# Patient Record
Sex: MALE | Employment: OTHER | URBAN - METROPOLITAN AREA
[De-identification: names, ages, dates, MRNs, and addresses within clinical notes are randomized per-mention and may not be internally consistent; named-entity substitution may affect disease eponyms.]

---

## 2018-10-09 DIAGNOSIS — I65.23 CAROTID STENOSIS, BILATERAL: Primary | ICD-10-CM

## 2018-10-12 ENCOUNTER — HOSPITAL ENCOUNTER (OUTPATIENT)
Dept: RADIOLOGY | Facility: HOSPITAL | Age: 73
Discharge: HOME | End: 2018-10-12
Attending: FAMILY MEDICINE
Payer: MEDICARE

## 2018-10-12 DIAGNOSIS — I65.23 CAROTID STENOSIS, BILATERAL: ICD-10-CM

## 2018-10-12 PROCEDURE — 93880 EXTRACRANIAL BILAT STUDY: CPT | Mod: TC

## 2018-10-29 ENCOUNTER — OFFICE VISIT (OUTPATIENT)
Dept: OTOLARYNGOLOGY | Facility: CLINIC | Age: 73
End: 2018-10-29
Payer: MEDICARE

## 2018-10-29 ENCOUNTER — CLINICAL SUPPORT (OUTPATIENT)
Dept: AUDIOLOGY | Facility: CLINIC | Age: 73
End: 2018-10-29
Payer: MEDICARE

## 2018-10-29 VITALS — HEIGHT: 69 IN | BODY MASS INDEX: 26.31 KG/M2 | WEIGHT: 177.63 LBS | TEMPERATURE: 98 F | HEART RATE: 62 BPM

## 2018-10-29 DIAGNOSIS — H81.09 ENDOLYMPHATIC HYDROPS, UNSPECIFIED LATERALITY: ICD-10-CM

## 2018-10-29 DIAGNOSIS — H90.3 SENSORINEURAL HEARING LOSS (SNHL) OF BOTH EARS: ICD-10-CM

## 2018-10-29 DIAGNOSIS — H93.11 TINNITUS, RIGHT EAR: ICD-10-CM

## 2018-10-29 PROCEDURE — 92557 COMPREHENSIVE HEARING TEST: CPT | Mod: PBBFAC | Performed by: AUDIOLOGIST-HEARING AID FITTER

## 2018-10-29 PROCEDURE — 1101F PT FALLS ASSESS-DOCD LE1/YR: CPT | Mod: CPTII,,, | Performed by: ORTHOPAEDIC SURGERY

## 2018-10-29 PROCEDURE — 99204 OFFICE O/P NEW MOD 45 MIN: CPT | Mod: S$PBB,,, | Performed by: ORTHOPAEDIC SURGERY

## 2018-10-29 PROCEDURE — 99999 PR PBB SHADOW E&M-EST. PATIENT-LVL III: CPT | Mod: PBBFAC,,, | Performed by: ORTHOPAEDIC SURGERY

## 2018-10-29 PROCEDURE — 99213 OFFICE O/P EST LOW 20 MIN: CPT | Mod: PBBFAC,25 | Performed by: ORTHOPAEDIC SURGERY

## 2018-10-29 PROCEDURE — 92567 TYMPANOMETRY: CPT | Mod: PBBFAC | Performed by: AUDIOLOGIST-HEARING AID FITTER

## 2018-10-29 RX ORDER — TRIAMCINOLONE ACETONIDE 1 MG/G
CREAM TOPICAL
Refills: 2 | COMMUNITY
Start: 2018-08-25

## 2018-10-29 RX ORDER — CARVEDILOL 3.12 MG/1
3.12 TABLET ORAL DAILY
Refills: 2 | COMMUNITY
Start: 2018-09-09

## 2018-10-29 RX ORDER — OMEPRAZOLE 20 MG/1
20 CAPSULE, DELAYED RELEASE ORAL
COMMUNITY
Start: 2014-09-26

## 2018-10-29 RX ORDER — ASPIRIN 81 MG/1
81 TABLET ORAL DAILY
Refills: 2 | COMMUNITY
Start: 2018-09-09

## 2018-10-29 RX ORDER — AMLODIPINE BESYLATE 10 MG/1
10 TABLET ORAL DAILY
Refills: 2 | COMMUNITY
Start: 2018-09-09

## 2018-10-29 RX ORDER — FLUOCINONIDE TOPICAL SOLUTION USP, 0.05% 0.5 MG/ML
SOLUTION TOPICAL
Refills: 2 | COMMUNITY
Start: 2018-08-25

## 2018-10-29 RX ORDER — SILDENAFIL 100 MG/1
100 TABLET, FILM COATED ORAL DAILY PRN
COMMUNITY
Start: 2014-09-26

## 2018-10-29 RX ORDER — SIMVASTATIN 20 MG/1
TABLET, FILM COATED ORAL
COMMUNITY
Start: 2014-09-26

## 2018-10-29 RX ORDER — TRIAMTERENE AND HYDROCHLOROTHIAZIDE 37.5; 25 MG/1; MG/1
CAPSULE ORAL
COMMUNITY
Start: 2015-05-19

## 2018-10-29 RX ORDER — TRIAMTERENE/HYDROCHLOROTHIAZID 37.5-25 MG
1 TABLET ORAL EVERY MORNING
Refills: 1 | COMMUNITY
Start: 2018-10-09 | End: 2018-10-29

## 2018-10-29 RX ORDER — ASPIRIN 81 MG/1
81 TABLET ORAL
COMMUNITY
End: 2018-10-29

## 2018-10-29 RX ORDER — OMEPRAZOLE 20 MG/1
CAPSULE, DELAYED RELEASE ORAL
Refills: 2 | COMMUNITY
Start: 2018-09-09

## 2018-10-29 RX ORDER — ATORVASTATIN CALCIUM 40 MG/1
TABLET, FILM COATED ORAL
Refills: 2 | COMMUNITY
Start: 2018-09-14

## 2018-10-29 RX ORDER — KETOCONAZOLE 20 MG/ML
SHAMPOO, SUSPENSION TOPICAL
Refills: 3 | COMMUNITY
Start: 2018-08-25

## 2018-10-29 NOTE — PROGRESS NOTES
Subjective:       Patient ID: Norman Rodriguez is a 73 y.o. male.    Chief Complaint: Hearing Loss    Patient is a very pleasant 73 y.o. male here to see me today for the first time for evaluation of hearing loss.  He reports hearing loss that has been gradually progressing over the last 10 years.  He has noted a difference in hearing between the ears, with the right ear being the better hearing ear.  He has noted any tinnitus in both ears.  He has not had any recent issues with ear pain or ear drainage.  He denies a family history of hearing loss, and has not had any previous otologic surgery.  He has a history of significant loud noise exposure, surrounded by machines at work.  He denies issues with dizziness.  He has seen Dr. Maher at [facility] with similar complaints, and he had an asymmetric SNHL with the right ear being worse than the left at that time, last seen in 2013.  From his documentation, he did have an MRI of the IAC at that time which was normal.  However, that evaluation did prompt a left CEA.        Review of Systems   Constitutional: Negative for fatigue, fever and unexpected weight change.   HENT: Positive for hearing loss and tinnitus. Negative for congestion, ear discharge, ear pain, facial swelling, nosebleeds, postnasal drip, rhinorrhea, sinus pressure, sneezing, sore throat, trouble swallowing and voice change.    Eyes: Negative for discharge, redness and itching.   Respiratory: Negative for cough, choking, shortness of breath and wheezing.    Cardiovascular: Negative for chest pain and palpitations.   Gastrointestinal: Negative for abdominal pain.        No reflux.   Musculoskeletal: Negative for neck pain.   Neurological: Negative for dizziness, facial asymmetry, light-headedness and headaches.   Hematological: Negative for adenopathy. Does not bruise/bleed easily.   Psychiatric/Behavioral: Negative for agitation, behavioral problems, confusion and decreased concentration.       Objective:       Physical Exam   Constitutional: He is oriented to person, place, and time. Vital signs are normal. He appears well-developed and well-nourished. No distress.   HENT:   Head: Normocephalic and atraumatic.   Right Ear: Hearing, tympanic membrane, external ear and ear canal normal.   Left Ear: Hearing, tympanic membrane, external ear and ear canal normal.   Nose: Nose normal. No mucosal edema, rhinorrhea, nasal deformity or septal deviation.   Mouth/Throat: Uvula is midline, oropharynx is clear and moist and mucous membranes are normal. No trismus in the jaw. Normal dentition. No uvula swelling. No oropharyngeal exudate or posterior oropharyngeal edema.   Eyes: Conjunctivae and EOM are normal. Pupils are equal, round, and reactive to light. Right eye exhibits no chemosis. Left eye exhibits no chemosis. Right conjunctiva is not injected. Left conjunctiva is not injected. No scleral icterus.   Neck: Trachea normal and phonation normal. No tracheal tenderness present. No tracheal deviation present. No thyroid mass and no thyromegaly present.   Cardiovascular: Intact distal pulses.   Pulmonary/Chest: Effort normal. No accessory muscle usage or stridor. No respiratory distress.   Lymphadenopathy:        Head (right side): No submental, no submandibular, no preauricular and no posterior auricular adenopathy present.        Head (left side): No submental, no submandibular, no preauricular and no posterior auricular adenopathy present.     He has no cervical adenopathy.        Right cervical: No superficial cervical and no deep cervical adenopathy present.       Left cervical: No superficial cervical and no deep cervical adenopathy present.   Neurological: He is alert and oriented to person, place, and time. No cranial nerve deficit.   Skin: Skin is warm and dry. No rash noted. No erythema.   Psychiatric: He has a normal mood and affect. His behavior is normal. Thought content normal.       AUDIOLOGY:  Asymmetric right SNHL,  right worse than left for nearly all frequencies        Assessment:       1. Asymmetrical right sensorineural hearing loss    2. Sensorineural hearing loss (SNHL) of both ears        Plan:       1.  Asymmetric right sensorineural hearing loss:  Will have him sign a release form breast obtain a copy of his MRI report from Dr. Maher.  Through his epic chart, I am able to see his note which does reference a normal MRI, but I cannot actually see the MRI report.  Discussed that if his MRI is negative, he is cleared to proceed with hearing aids, and often people have an asymmetric hearing loss with 1 ear being worse than the left.  2.  Sensorineural hearing loss bilaterally:  He says that he has tried hearing aids own, and did find some benefit from having a in the right. I would recommend he try bilaterally vocation, but due to his severe distortion he may benefit from only 1 aid in the left ear.  Will schedule follow-up with audiology in the near future for hearing aid evaluation.

## 2018-10-29 NOTE — LETTER
October 29, 2018      Jameel Smart MD  [id number] [facility] [address]           [facility]  [address] [place] [id number]  Phone: [phone]  Fax: [phone]          Patient: Norman Rodriguez   MR Number: [id number]   YOB: 1945   Date of Visit: 10/29/2018       Dear Dr. Jameel Smart:    Thank you for referring Norman Rodriguez to me for evaluation. Attached you will find relevant portions of my assessment and plan of care.    If you have questions, please do not hesitate to call me. I look forward to following Norman Rodriguez along with you.    Sincerely,    Shahla Grant MD    Enclosure  CC:  No Recipients    If you would like to receive this communication electronically, please contact [email or web address] or [phone] to request more information on [employer] access.    For providers and/or their staff who would like to refer a patient to [facility], please contact us through our one-stop-shop provider referral line, [facility], at [phone].    If you feel you have received this communication in error or would no longer like to receive these types of communications, please e-mail [email or web address]

## 2018-11-06 ENCOUNTER — CLINICAL SUPPORT (OUTPATIENT)
Dept: AUDIOLOGY | Facility: CLINIC | Age: 73
End: 2018-11-06
Payer: MEDICARE

## 2018-11-06 DIAGNOSIS — Z71.89 HEARING AID CONSULTATION: Primary | ICD-10-CM

## 2018-11-06 NOTE — PROGRESS NOTES
Norman Rodriguez was seen 11/06/2018 for a hearing aid consult to discuss hearing aids.   He does not currently wear hearing aids.  He tried in-office demo of binaural amplification elsewhere and found significant benefit in the office.   He had been to several hearing aid retail chains to talk about hearing aids.  He has 30+ year history of hearing loss, right asymmetrical; has followed with Dr. Ramires.  Most recent audiogram revealed he had no word recognition for the right ear (describes distortion AD).  For that reason, I have concerns about traditional amplification to both ears outside of the office setting.  I discussed BiCROS.  This is the first time he hears about BiCROS.  I discussed option of trying binaural amplification vs BiCROS, and explained trial period.  His primary concern is understanding his wife.  He was counseled about styles (custom vs CSATILLO), different technology levels including channels and bands, and I briefly spoke about hearing aid features such as phone direct, streaming, rechargeable.  He expects [facility] will provide hearing aid benefits in 2019; likely elsewhere.  He wants to wait to see the benefits before making a decision.  Patient was provided a copy of his audiogram, [employer] CROS brochure, [employer]to brochure and [employer] hearing aid packet.  He will call back for detailed HAC when ready.